# Patient Record
Sex: FEMALE | Race: WHITE | URBAN - METROPOLITAN AREA
[De-identification: names, ages, dates, MRNs, and addresses within clinical notes are randomized per-mention and may not be internally consistent; named-entity substitution may affect disease eponyms.]

---

## 2017-02-14 ENCOUNTER — TRANSFERRED RECORDS (OUTPATIENT)
Dept: HEALTH INFORMATION MANAGEMENT | Facility: CLINIC | Age: 47
End: 2017-02-14

## 2018-05-01 ENCOUNTER — TRANSFERRED RECORDS (OUTPATIENT)
Dept: HEALTH INFORMATION MANAGEMENT | Facility: CLINIC | Age: 48
End: 2018-05-01

## 2018-05-11 ENCOUNTER — TRANSFERRED RECORDS (OUTPATIENT)
Dept: HEALTH INFORMATION MANAGEMENT | Facility: CLINIC | Age: 48
End: 2018-05-11

## 2018-07-11 ENCOUNTER — OFFICE VISIT (OUTPATIENT)
Dept: ENDOCRINOLOGY | Facility: CLINIC | Age: 48
End: 2018-07-11
Payer: COMMERCIAL

## 2018-07-11 ENCOUNTER — TRANSFERRED RECORDS (OUTPATIENT)
Dept: HEALTH INFORMATION MANAGEMENT | Facility: CLINIC | Age: 48
End: 2018-07-11

## 2018-07-11 VITALS — DIASTOLIC BLOOD PRESSURE: 77 MMHG | HEART RATE: 70 BPM | SYSTOLIC BLOOD PRESSURE: 127 MMHG | WEIGHT: 167.99 LBS

## 2018-07-11 DIAGNOSIS — E10.9 TYPE 1 DIABETES MELLITUS WITHOUT COMPLICATION (H): Primary | ICD-10-CM

## 2018-07-11 DIAGNOSIS — E78.5 HYPERLIPIDEMIA WITH TARGET LDL LESS THAN 100: ICD-10-CM

## 2018-07-11 LAB — HBA1C MFR BLD: 6.9 % (ref 0–5.7)

## 2018-07-11 PROCEDURE — 36415 COLL VENOUS BLD VENIPUNCTURE: CPT | Performed by: INTERNAL MEDICINE

## 2018-07-11 PROCEDURE — 83036 HEMOGLOBIN GLYCOSYLATED A1C: CPT | Performed by: INTERNAL MEDICINE

## 2018-07-11 PROCEDURE — 99204 OFFICE O/P NEW MOD 45 MIN: CPT | Performed by: INTERNAL MEDICINE

## 2018-07-11 RX ORDER — KRILL/OM-3/DHA/EPA/PHOSPHO/AST 500MG-86MG
1 CAPSULE ORAL DAILY
COMMUNITY
End: 2018-11-13

## 2018-07-11 RX ORDER — MULTIVIT-MIN/IRON/FOLIC ACID/K 18-600-40
1 CAPSULE ORAL DAILY
COMMUNITY

## 2018-07-11 RX ORDER — ATORVASTATIN CALCIUM 40 MG/1
40 TABLET, FILM COATED ORAL DAILY
COMMUNITY

## 2018-07-11 NOTE — PATIENT INSTRUCTIONS
Foods to Encourage or Discourage:  1. Eat more fruits, vegetables and nuts in place of processed carbohydrates.  2. Choose healthier carbohydrates.  3. Choose healthier proteins.  4. No trans fats, reduce animal based saturated fats and replace with healthier, plant-based fats and oils. Learn to cook with these!  5. Avoid processed foods and desserts.  6. Imagine your  ideal plate  - protein; healthier carb; vegetables.  7. Consider the  dessert flip  with more fruit and smaller portions of indulgent favorites or  three pleasures - chocolate, fruit, nuts.   8. Portion control is nataliya -  It s the calories!!!   9. Find opportunities to reduce salt. Season with herbs and spices first.  10. Replace sugar sweetened beverages, emphasizing water, tea, and coffee.    Take Home Messages:  ? Make most of your meal vegetables and fruits -   of your plate: Aim for color and variety, and remember that potatoes don t count as vegetables on the Healthy Eating Plate because of their negative impact on blood sugar.  ? Go for whole grains -   of your plate: Whole and intact grains--whole wheat, barley, wheat berries, quinoa, oats, brown rice, and foods made with them, such as whole wheat pasta--have a milder effect on blood sugar and insulin than white bread, white rice, and other refined grains.  ? Protein power -   of your plate: Fish, chicken, beans, and nuts are all healthy, versatile protein sources--they can be mixed into salads, and pair well with vegetables on a plate. Limit red meat, and avoid processed meats such as sureo and sausage.  ? Healthy plant oils - in moderation: Choose healthy vegetable oils like olive, canola, soy, corn, sunflower, peanut, and others, and avoid partially hydrogenated oils, which contain unhealthy trans fats.  ? Drink water, coffee, or tea: Skip sugary drinks, limit milk and dairy products to one to two servings per day, and limit juice to a small glass per day.  ? Stay active: Staying active is  important in weight control                          Sending blood sugars to your provider at [facility]:  We want to help you with your diabetes management, which often requires frequent adjustments to your therapy. For your convenience, we have several ways to send your blood sugars to your doctor for review.    - Send message directly to your doctor through My Chart.  Please ask the rooming staff if you would like to sign up for My Chart.  This is a fast and confidential way to send your information and communicate directly with your provider.   - Record readings and fax to [phone].  We have a template for you to use for your convenience.  - Stop by the clinic with your meter for download if advised by provider.   - My Chart or call Vale Collado, Diabetes Educator at [phone]  - Call the clinic and speak to one of the endocrine nurses to relay information on the telephone.  Claudia Fuentes,at [phone].   -    Please call the on-call Endocrinologist at the [facility] for after       hours/weekend needs at [phone] Option #4.    Please note that you do not need to FAST if you are just having an A1C drawn. Please remember to ALWAYS bring your glucose meter with to your appointment. This data is very important for the management of your care.    Thank you!  Your [facility] Team

## 2018-07-11 NOTE — NURSING NOTE
Nancy Bedoya's goals for this visit include: Diabetes, New Pt  She requests these members of her care team be copied on today's visit information: PCP    PCP: Andrea Asencio    Referring Provider:  Andrea Asencio  [facility]  [address] [facility] [place] [address]    Pulse 70  Wt 76.2 kg (167 lb 15.9 oz)  /77    Do you need any medication refills at today's visit? None

## 2018-07-11 NOTE — LETTER
7/11/2018         RE: Nancy Bedoya  [address] [place] [address]        Dear Colleague,    Thank you for referring your patient, Nancy Bedoya, to the [facility]. Please see a copy of my visit note below.      The patient is seen in consultation at the request of Dr. Andrea Asencio for evaluation of type 1 diabetes.    Nancy Bedoya is a 48 year old female diagnosed with gestational diabetes with 2 out of her 3 pregnancies (the first pregnancy, at age 27, and the second pregnancy, at age 29).  Shortly after her second pregnancy, she was formally diagnosed diabetes and, for a short period of time, she was treated with oral medications. Subsequently, she was told she has a low C-peptide and type 1 diabetes.  Insulin treatment was started around age 30 and she has been using CSII through insulin pump since age 32.  In the past, she was on Medtronic insulin pump.  Since 2017, she is using a Tandem pump.    Average hemoglobin A1c over the last 5 years has been around 7%.  In the past, it used to be better controlled, below 7%.  Her diabetes is not known to be complicated by microvascular disease.    Hemoglobin A1c was 6.9%.  She denies ever being on continuous glucose monitoring.  Since last August, she enrolled in the [employer] and she was able to lose 40 pounds.  She is now transitioning from this diet and she is pleased by the fact that she was able to keep the weight off.    In a regular day, she has 6 small meals/snacks, throughout the day.  She describes herself as constantly grazing.  With large meals, she has a hard time controlling the blood sugar with insulin boluses.  If she has over 70 g of carbohydrates/meal, her blood sugars almost always high.  With smaller meals, she feels she achieves a better blood glucose control.  For snacks, she frequently has protein bars, peanuts, popcorn.    Insulin pump settings:  Basal rate  12 AM 0.8 U/h, 3 AM 1.35 U/h, 7 AM 0.8 U/h, 12 PM 1  U/h  Insulin to carbohydrate ratio 1 unit per 10  ISF 50  Target 120  Active insulin time 4 hours    Average daily dose of insulin is 36 units, of which 46% is basal insulin.  She changes the infusion site every third day.  Average daily carbohydrate intake is 65 g.  60% of the blood sugar numbers are within target of .  On the meter, she checks her blood glucose 5 times daily.  Average blood glucose is 171 with a standard deviation of 74 and a range variable between 36 and 372.  The morning blood sugar has a wide range of variation, with intermittent values in the 200 range.  In the last 2 weeks, there are 3 hypoglycemic episodes documented by the meter.  All of them occurred in the afternoon/evening.  1 of them was a result of correcting her blood sugar of 372, with a manual bolus.  The lowest blood glucose of 36 occurred after 7 manual boluses of 2-4 units which occurred over a period of 4 hours, without her checking her blood sugar.  Most of the boluses are manual boluses of 2-5 units.  Quite frequently, she also boluses manually for correction.  She states she prefers to bolus manually as she knows the carbohydrate content of her meals and snacks, most of the time.    Diabetes complications:   Retinopathy: last eye exam - May 2018; no DR   Nephropathy: no H/O microalbuminuria   Neuropathy: no numbness or tingling sensation   If her blood sugar is in the 60s, she develops sweating, tremor and lightheadedness.  She corrects the hypoglycemic episodes by having 15 g of the food snacks.  The lowest blood glucose numbers she remembers experiencing was in the 40s.  She denies prior episodes of loss of consciousness due to hypoglycemia.  She does not have glucagon at home.  Exercise: Walks the dogs in the evening, for 30 minutes.   Treatment with atorvastatin was started 9 months ago, currently at 40 mg daily.    I reviewed outside lab results  1/24/17  Hemoglobin A1c 7.5, total cholesterol 188, triglycerides 90,  HDL 54, , GFR 89    12/12/17  Hemoglobin A1c 6.1, negative urine microalbumin    5/11/18  Triglycerides 53, HDL 56, LDL 43, ALT 18    Past Medical History   Obesity   Hyperlipidemia   Type 1 diabetes  One episode of vertigo   Screening for celiac negative in 2014  On Mirena IUD - for >10 years (no MP)     Past Surgical Hystory   Tonsillectomy and adenoidectoy  C section x 1   Current Medications       Family History   T1DM in her sister, T2DM in her father. Father - valcular heart disease. Mother - Parkinson's and osteoporosis. Brother - scleroderma. One sister - osteoporosis. Mother broke her hip in her 70s.     Social History   with 3 children. She denies smoking or using illicit drugs. She drinks 1-2 glasses of wine, twice a week. Occupation: .     Review of Systems   Systemic:               No sign fatigue  Eye:                       No eye symptoms   Edgar-Laryngeal:      No edgar-laryngeal symptoms, no dysphagia, no voice hoarseness, no cough      Breast:                   No breast symptoms  Cardiovascular:     No cardiovascular symptoms, no CP or palpitations   Pulmonary:            No pulmonary symptoms, no cough or SOB    Gastrointestinal:    No gastrointestinal symptoms, no diarrhea or constipation   Genitourinary:        No genitourinary symptoms    Endocrine:             No endocrine symptoms, no heat or cold intolerance   Neurological:          no headaches, no tremor, no dizziness     Musculoskeletal:    Left hip intermittent pain   Skin:                       Scalp eczema with itchiness   Psychological:       No psychological symptoms                Vital Signs     Previous Weights:    Wt Readings from Last 10 Encounters:   07/11/18 76.2 kg (167 lb 15.9 oz)                   /77  Pulse 70  Wt 76.2 kg (167 lb 15.9 oz)    Physical Exam  General Appearance:  she is well developed, well nourished and in no distress     Eyes:  conjutivae and extra-ocular motions are normal.      "                               pupils round and reactive to light, no lid lag, no stare    HEENT:   oropharynx clear and moist, no JVD, no bruits      no thyromegaly, no palpable nodules   Cardiovascular:  regular rhythm, systolic murmur right 2nd IC space, distal pulse palpable, no edema  Respiratory:       chest clear, no rales, no rhonchi   Gastrointestinal: abdomen soft, non-tender, non-distended, normal bowel sounds,   no organomegaly   Musculoskeletal: normal tone and strength  Psychiatric: affect and judgment normal  Skin: Benign abd striae   Neurological: reflexes normal and symmetric, no resting tremor   Feet                          sensation intact to monofilament testing      Lab Results  I reviewed prior lab results documented in Epic.  Lab Results   Component Value Date    A1C 6.9 07/11/2018     Assessment     Type 1 diabetes in a 48 year old female, diagnosed around age 29, fairly well controlled, without known microvascular disease.  She frequently boluses manually, both for food intake and correction, and this results in occasional episodes of insulin stalking and hypoglycemia.    The following recommendations were discussed with the patient:  Always use the bolus settings for all meals and snacks.  Use the pump to administer insulin as correction, based on the blood sugar value  She does not \"need\" frequent meals or snacks during the day.  Sometimes, with frequent eating, it is harder to maintain the weight.  However, as long as she takes the appropriate amount of insulin for what she eats, the glycemic control should be good.  Consider a glucose sensor.  The advantages were discussed with the patient: Better blood glucose control during the night, alarms for both highs and lows, less blood glucose checks, providing help in adjusting the basal rate and insulin to carbohydrate ratio.  She is going to check with her insurance whether or not the Dexcom G6 cost is covered.  Use of glucagon for severe " hypoglycemic episodes.  Prescription entered.    The patient has questions regarding what kind of diet she should follow.  I endorsed complex diets, with fairly low carbohydrate intake.  In particular, we discussed about the Mediterranean diet and ways she can decrease the overall caloric intake.     Orders Placed This Encounter   Procedures     Hemoglobin A1c POCT      Total visit time 50 min/counceling and coordination of care 25 min.        Again, thank you for allowing me to participate in the care of your patient.        Sincerely,        Page Rosado MD

## 2018-07-11 NOTE — PROGRESS NOTES
The patient is seen in consultation at the request of Dr. Andrea Asencio for evaluation of type 1 diabetes.    Nancy Bedoya is a 48 year old female diagnosed with gestational diabetes with 2 out of her 3 pregnancies (the first pregnancy, at age 27, and the second pregnancy, at age 29).  Shortly after her second pregnancy, she was formally diagnosed diabetes and, for a short period of time, she was treated with oral medications. Subsequently, she was told she has a low C-peptide and type 1 diabetes.  Insulin treatment was started around age 30 and she has been using CSII through insulin pump since age 32.  In the past, she was on Medtronic insulin pump.  Since 2017, she is using a Tandem pump.    Average hemoglobin A1c over the last 5 years has been around 7%.  In the past, it used to be better controlled, below 7%.  Her diabetes is not known to be complicated by microvascular disease.    Hemoglobin A1c was 6.9%.  She denies ever being on continuous glucose monitoring.  Since last August, she enrolled in the [employer] and she was able to lose 40 pounds.  She is now transitioning from this diet and she is pleased by the fact that she was able to keep the weight off.    In a regular day, she has 6 small meals/snacks, throughout the day.  She describes herself as constantly grazing.  With large meals, she has a hard time controlling the blood sugar with insulin boluses.  If she has over 70 g of carbohydrates/meal, her blood sugars almost always high.  With smaller meals, she feels she achieves a better blood glucose control.  For snacks, she frequently has protein bars, peanuts, popcorn.    Insulin pump settings:  Basal rate  12 AM 0.8 U/h, 3 AM 1.35 U/h, 7 AM 0.8 U/h, 12 PM 1 U/h  Insulin to carbohydrate ratio 1 unit per 10  ISF 50  Target 120  Active insulin time 4 hours    Average daily dose of insulin is 36 units, of which 46% is basal insulin.  She changes the infusion site every third day.  Average daily  carbohydrate intake is 65 g.  60% of the blood sugar numbers are within target of .  On the meter, she checks her blood glucose 5 times daily.  Average blood glucose is 171 with a standard deviation of 74 and a range variable between 36 and 372.  The morning blood sugar has a wide range of variation, with intermittent values in the 200 range.  In the last 2 weeks, there are 3 hypoglycemic episodes documented by the meter.  All of them occurred in the afternoon/evening.  1 of them was a result of correcting her blood sugar of 372, with a manual bolus.  The lowest blood glucose of 36 occurred after 7 manual boluses of 2-4 units which occurred over a period of 4 hours, without her checking her blood sugar.  Most of the boluses are manual boluses of 2-5 units.  Quite frequently, she also boluses manually for correction.  She states she prefers to bolus manually as she knows the carbohydrate content of her meals and snacks, most of the time.    Diabetes complications:   Retinopathy: last eye exam - May 2018; no DR   Nephropathy: no H/O microalbuminuria   Neuropathy: no numbness or tingling sensation   If her blood sugar is in the 60s, she develops sweating, tremor and lightheadedness.  She corrects the hypoglycemic episodes by having 15 g of the food snacks.  The lowest blood glucose numbers she remembers experiencing was in the 40s.  She denies prior episodes of loss of consciousness due to hypoglycemia.  She does not have glucagon at home.  Exercise: Walks the dogs in the evening, for 30 minutes.   Treatment with atorvastatin was started 9 months ago, currently at 40 mg daily.    I reviewed outside lab results  1/24/17  Hemoglobin A1c 7.5, total cholesterol 188, triglycerides 90, HDL 54, , GFR 89    12/12/17  Hemoglobin A1c 6.1, negative urine microalbumin    5/11/18  Triglycerides 53, HDL 56, LDL 43, ALT 18    Past Medical History   Obesity   Hyperlipidemia   Type 1 diabetes  One episode of vertigo    Screening for celiac negative in 2014  On Mirena IUD - for >10 years (no MP)     Past Surgical Hystory   Tonsillectomy and adenoidectoy  C section x 1   Current Medications       Family History   T1DM in her sister, T2DM in her father. Father - valcular heart disease. Mother - Parkinson's and osteoporosis. Brother - scleroderma. One sister - osteoporosis. Mother broke her hip in her 70s.     Social History   with 3 children. She denies smoking or using illicit drugs. She drinks 1-2 glasses of wine, twice a week. Occupation: .     Review of Systems   Systemic:               No sign fatigue  Eye:                       No eye symptoms   Edgar-Laryngeal:      No edgar-laryngeal symptoms, no dysphagia, no voice hoarseness, no cough      Breast:                   No breast symptoms  Cardiovascular:     No cardiovascular symptoms, no CP or palpitations   Pulmonary:            No pulmonary symptoms, no cough or SOB    Gastrointestinal:    No gastrointestinal symptoms, no diarrhea or constipation   Genitourinary:        No genitourinary symptoms    Endocrine:             No endocrine symptoms, no heat or cold intolerance   Neurological:          no headaches, no tremor, no dizziness     Musculoskeletal:    Left hip intermittent pain   Skin:                       Scalp eczema with itchiness   Psychological:       No psychological symptoms                Vital Signs     Previous Weights:    Wt Readings from Last 10 Encounters:   07/11/18 76.2 kg (167 lb 15.9 oz)                   /77  Pulse 70  Wt 76.2 kg (167 lb 15.9 oz)    Physical Exam  General Appearance:  she is well developed, well nourished and in no distress     Eyes:  conjutivae and extra-ocular motions are normal.                                    pupils round and reactive to light, no lid lag, no stare    HEENT:   oropharynx clear and moist, no JVD, no bruits      no thyromegaly, no palpable nodules   Cardiovascular:  regular rhythm, systolic  "murmur right 2nd IC space, distal pulse palpable, no edema  Respiratory:       chest clear, no rales, no rhonchi   Gastrointestinal: abdomen soft, non-tender, non-distended, normal bowel sounds,   no organomegaly   Musculoskeletal: normal tone and strength  Psychiatric: affect and judgment normal  Skin: Benign abd striae   Neurological: reflexes normal and symmetric, no resting tremor   Feet                          sensation intact to monofilament testing      Lab Results  I reviewed prior lab results documented in Epic.  Lab Results   Component Value Date    A1C 6.9 07/11/2018     Assessment     Type 1 diabetes in a 48 year old female, diagnosed around age 29, fairly well controlled, without known microvascular disease.  She frequently boluses manually, both for food intake and correction, and this results in occasional episodes of insulin stalking and hypoglycemia.    The following recommendations were discussed with the patient:  Always use the bolus settings for all meals and snacks.  Use the pump to administer insulin as correction, based on the blood sugar value  She does not \"need\" frequent meals or snacks during the day.  Sometimes, with frequent eating, it is harder to maintain the weight.  However, as long as she takes the appropriate amount of insulin for what she eats, the glycemic control should be good.  Consider a glucose sensor.  The advantages were discussed with the patient: Better blood glucose control during the night, alarms for both highs and lows, less blood glucose checks, providing help in adjusting the basal rate and insulin to carbohydrate ratio.  She is going to check with her insurance whether or not the Dexcom G6 cost is covered.  Use of glucagon for severe hypoglycemic episodes.  Prescription entered.    The patient has questions regarding what kind of diet she should follow.  I endorsed complex diets, with fairly low carbohydrate intake.  In particular, we discussed about the " Mediterranean diet and ways she can decrease the overall caloric intake.     Orders Placed This Encounter   Procedures     Hemoglobin A1c POCT      Total visit time 50 min/counceling and coordination of care 25 min.

## 2018-07-11 NOTE — MR AVS SNAPSHOT
After Visit Summary   7/11/2018    Nancy Bedoya    MRN: [id number]           Patient Information     Date Of Birth          1970        Visit Information        Provider Department      7/11/2018 9:00 AM Page Rosado MD [facility]        Today's Diagnoses     Type 1 diabetes mellitus without complication (H)    -  1    Hyperlipidemia with target LDL less than 100          Care Instructions    Foods to Encourage or Discourage:  1. Eat more fruits, vegetables and nuts in place of processed carbohydrates.  2. Choose healthier carbohydrates.  3. Choose healthier proteins.  4. No trans fats, reduce animal based saturated fats and replace with healthier, plant-based fats and oils. Learn to cook with these!  5. Avoid processed foods and desserts.  6. Imagine your  ideal plate  - protein; healthier carb; vegetables.  7. Consider the  dessert flip  with more fruit and smaller portions of indulgent favorites or  three pleasures - chocolate, fruit, nuts.   8. Portion control is nataliya -  It s the calories!!!   9. Find opportunities to reduce salt. Season with herbs and spices first.  10. Replace sugar sweetened beverages, emphasizing water, tea, and coffee.    Take Home Messages:  ? Make most of your meal vegetables and fruits -   of your plate: Aim for color and variety, and remember that potatoes don t count as vegetables on the Healthy Eating Plate because of their negative impact on blood sugar.  ? Go for whole grains -   of your plate: Whole and intact grains--whole wheat, barley, wheat berries, quinoa, oats, brown rice, and foods made with them, such as whole wheat pasta--have a milder effect on blood sugar and insulin than white bread, white rice, and other refined grains.  ? Protein power -   of your plate: Fish, chicken, beans, and nuts are all healthy, versatile protein sources--they can be mixed into salads, and pair well with vegetables on a plate. Limit red meat,  and avoid processed meats such as suero and sausage.  ? Healthy plant oils - in moderation: Choose healthy vegetable oils like olive, canola, soy, corn, sunflower, peanut, and others, and avoid partially hydrogenated oils, which contain unhealthy trans fats.  ? Drink water, coffee, or tea: Skip sugary drinks, limit milk and dairy products to one to two servings per day, and limit juice to a small glass per day.  ? Stay active: Staying active is important in weight control                          Sending blood sugars to your provider at [place]:  We want to help you with your diabetes management, which often requires frequent adjustments to your therapy. For your convenience, we have several ways to send your blood sugars to your doctor for review.    - Send message directly to your doctor through My Chart.  Please ask the rooming staff if you would like to sign up for My Chart.  This is a fast and confidential way to send your information and communicate directly with your provider.   - Record readings and fax to [phone].  We have a template for you to use for your convenience.  - Stop by the clinic with your meter for download if advised by provider.   - My Chart or call Vale Collado, Diabetes Educator at [phone]  - Call the clinic and speak to one of the endocrine nurses to relay information on the telephone.  Claudia Fuentes,at [phone].   -    Please call the on-call Endocrinologist at the [facility] for after       hours/weekend needs at [phone] Option #4.    Please note that you do not need to FAST if you are just having an A1C drawn. Please remember to ALWAYS bring your glucose meter with to your appointment. This data is very important for the management of your care.    Thank you!  Your [place] [facility] Team                Follow-ups after your visit        Follow-up notes from your care team     Return in about 4 months (around 11/11/2018).      Your next 10 appointments  already scheduled     Nov 13, 2018 10:45 AM CST   Return Visit with Page Rosado MD, MG ENDO NURSE   [facility]    [address] [address] [place] [id number] [phone]              Future tests that were ordered for you today     Open Standing Orders        Priority Remaining Interval Expires Ordered    Hemoglobin A1c POCT Routine 9/10  7/11/2019 7/11/2018            Who to contact     If you have questions or need follow up information about today's clinic visit or your schedule please contact [facility] directly at [phone].  Normal or non-critical lab and imaging results will be communicated to you by MyChart, letter or phone within 4 business days after the clinic has received the results. If you do not hear from us within 7 days, please contact the clinic through MyChart or phone. If you have a critical or abnormal lab result, we will notify you by phone as soon as possible.  Submit refill requests through [employer] or call your pharmacy and they will forward the refill request to us. Please allow 3 business days for your refill to be completed.          Additional Information About Your Visit        Care EveryWhere ID     This is your Care EveryWhere ID. This could be used by other organizations to access your [place] medical records  [id number]        Your Vitals Were     Pulse                   70            Blood Pressure from Last 3 Encounters:   07/11/18 127/77    Weight from Last 3 Encounters:   07/11/18 76.2 kg (167 lb 15.9 oz)              We Performed the Following     Hemoglobin A1c POCT          Today's Medication Changes          These changes are accurate as of 7/11/18 10:20 AM.  If you have any questions, ask your nurse or doctor.               Start taking these medicines.        Dose/Directions    glucagon 1 MG kit   Commonly known as:  GLUCAGON EMERGENCY   Used for:  Type 1 diabetes mellitus without  complication (H)   Started by:  Page Rosado MD        Dose:  1 mg   Inject 1 mg into the muscle once for 1 dose   Quantity:  1 mg   Refills:  3            Where to get your medicines      These medications were sent to [id number] - [place], [place] - [address]  [address], [facility] [address]     Phone:  [phone]     glucagon 1 MG kit                Primary Care Provider Office Phone # Fax #    Andrea Asencio [phone]       [facility] [address] [facility] [place] [address]        Equal Access to Services     [facility]: Hadii aad ku hadasho Soomaali, waaxda luqadaha, qaybta kaalmada adeegyada, waxay idiin hayaan adeeg giovanny rivera . So [facility] [phone].    ATENCIÓN: Si habla español, tiene a mosqueda disposición servicios gratuitos de asistencia lingüística. Llame al [phone].    We comply with applicable federal civil rights laws and [place] laws. We do not discriminate on the basis of race, color, national origin, age, disability, sex, sexual orientation, or gender identity.            Thank you!     Thank you for choosing [facility]  for your care. Our goal is always to provide you with excellent care. Hearing back from our patients is one way we can continue to improve our services. Please take a few minutes to complete the written survey that you may receive in the mail after your visit with us. Thank you!             Your Updated Medication List - Protect others around you: Learn how to safely use, store and throw away your medicines at www.disposemymeds.org.          This list is accurate as of 7/11/18 10:20 AM.  Always use your most recent med list.                   Brand Name Dispense Instructions for use Diagnosis    atorvastatin 40 MG tablet    LIPITOR     Take 40 mg by mouth daily        glucagon 1 MG kit    GLUCAGON EMERGENCY    1 mg    Inject 1 mg into the muscle once for 1 dose    Type 1  diabetes mellitus without complication (H)       insulin lispro 100 UNIT/ML injection    HumaLOG     Inject Subcutaneous 3 times daily (before meals) Continuous per Tandem Pump        Krill Oil 500 MG Caps      Take 1 capsule by mouth daily        levonorgestrel 20 MCG/24HR IUD    MIRENA     1 each by Intrauterine route once        vitamin D 2000 units Caps      Take 1 tablet by mouth daily

## 2018-11-13 ENCOUNTER — HEALTH MAINTENANCE LETTER (OUTPATIENT)
Age: 48
End: 2018-11-13

## 2018-11-13 ENCOUNTER — OFFICE VISIT (OUTPATIENT)
Dept: ENDOCRINOLOGY | Facility: CLINIC | Age: 48
End: 2018-11-13
Payer: COMMERCIAL

## 2018-11-13 VITALS
DIASTOLIC BLOOD PRESSURE: 82 MMHG | OXYGEN SATURATION: 100 % | SYSTOLIC BLOOD PRESSURE: 125 MMHG | BODY MASS INDEX: 32.5 KG/M2 | HEART RATE: 66 BPM | HEIGHT: 62 IN | WEIGHT: 176.6 LBS

## 2018-11-13 DIAGNOSIS — E10.9 TYPE 1 DIABETES MELLITUS NOT AT GOAL (H): Primary | ICD-10-CM

## 2018-11-13 LAB — HBA1C MFR BLD: 7.1 % (ref 0–5.6)

## 2018-11-13 PROCEDURE — 99214 OFFICE O/P EST MOD 30 MIN: CPT | Performed by: INTERNAL MEDICINE

## 2018-11-13 PROCEDURE — 83036 HEMOGLOBIN GLYCOSYLATED A1C: CPT | Performed by: INTERNAL MEDICINE

## 2018-11-13 PROCEDURE — 36415 COLL VENOUS BLD VENIPUNCTURE: CPT | Performed by: INTERNAL MEDICINE

## 2018-11-13 NOTE — NURSING NOTE
"Nancy Bedoya's goals for this visit include: Dm follow up  She requests these members of her care team be copied on today's visit information: No    PCP: Andrea Asencio    Referring Provider:  No referring provider defined for this encounter.    /82 (BP Location: Left arm, Patient Position: Chair, Cuff Size: Adult Regular)  Pulse 66  Ht 1.565 m (5' 1.61\")  Wt 80.1 kg (176 lb 9.6 oz)  SpO2 100%  BMI 32.71 kg/m2    Do you need any medication refills at today's visit? No    "

## 2018-11-13 NOTE — PATIENT INSTRUCTIONS
[facility]  Vale Collado RN, Diabetes Educator: [phone]  Clinic Nurses Claudia Fuentes: [phone]  Clinic Fax: [phone]  On-Call Endocrine at the [facility] (after hours/weekends): [phone] option 4  Scheduling Line: [phone]    Appointment Reminders:  * Please bring meter with for staff to download  * If you are due ONLY for an A1C, it is scheduled with the nurse and will be done in clinic. You do not need to schedule a lab appointment. Fasting is not required for an A1C.  * Refill request should be submitted to your pharmacy. They will contact clinic for approval.    Preventive Care:    Diabetic Eye Exam Screening: During our visit today, we discussed that it is recommended you receive diabetic eye exam screening. Please call or make an appointment with your primary care provider to discuss this with them. You may also call the  [employer] scheduling line ([phone]) to set up an eye exam at one of the [facility].            Preventive Care:    Diabetic Eye Exam Screening: During our visit today, we discussed that it is recommended you receive diabetic eye exam screening. Please call or make an appointment with your primary care provider to discuss this with them. You may also call the  [employer] scheduling line ([phone]) to set up an eye exam at one of the [facility].

## 2018-11-13 NOTE — Clinical Note
Please abstract the following data from this visit with this patient into the appropriate field in Epic:  Eye exam with ophthalmology on this date: May 2018, [facility] [place]

## 2018-11-13 NOTE — LETTER
11/13/2018         RE: Nancy Bedoya  [address] [place] [address]        Dear Colleague,    Thank you for referring your patient, Nancy Beodya, to the [facility]. Please see a copy of my visit note below.      The patient is seen in f/up for evaluation of type 1 diabetes. She was fist seen in our clinic in 7/2018.     Nancy Bedoya is a 48 year old female diagnosed with gestational diabetes with 2 out of her 3 pregnancies (the first pregnancy, at age 27, and the second pregnancy, at age 29).  Shortly after her second pregnancy, she was formally diagnosed diabetes and, for a short period of time, she was treated with oral medications. Subsequently, she was told she has a low C-peptide and type 1 diabetes.  Insulin treatment was started around age 30 and she has been using CSII through insulin pump since age 32.  In the past, she was on Medtronic insulin pump.  Since 2017, she is using a Tandem pump.    Average hemoglobin A1c over the recent years has been around 7%. Her diabetes is not known to be complicated by microvascular disease.  Since her last visit here, she reports being compliant with Medifast diet.  Subsequently, she lost 40 pounds of which she regained 10, recently.    Hemoglobin A1c today was 7.1% stable since her last visit here when it was  6.9%.    In a regular day, she has 6 small meals/snacks, throughout the day.  She describes herself as constantly grazing.  With smaller meals, she feels she achieves a better blood glucose control.  For snacks, she frequently has protein bars, peanuts, popcorn.    Insulin pump settings:  Basal rate  12 AM 0.8 U/h, 3 AM 1 U/h, 7 AM 0.8 U/h, 12 PM 1 U/h  Insulin to carbohydrate ratio 1 unit per 10 and 1 unit per 13 g from 3 PM to 12 midnight  ISF 50  Target 120  Active insulin time 2 hours    Average daily dose of insulin is 42 units, of which 47% is basal insulin.    On the meter, average blood glucose is 150 with a standard deviation  of 68 and a range variable between 54 and 338.  She checks her blood glucose 3 times daily, not so much in the evening and at bedtime. The hypoglycemic episodes are present once or twice a week, always during daytime, not at night.  They have an unpredictable pattern and they are mild. Recently she has been using the bolus settings, mainly in the last week. Prior, most of the boluses are manual boluses.    Diabetes complications:   Retinopathy: last eye exam - May 2018; no DR   Nephropathy: no H/O microalbuminuria   Neuropathy: no numbness or tingling sensation   If her blood sugar is in the 60s, she develops sweating, tremor and lightheadedness.  She corrects the hypoglycemic episodes by having 15 g of the food snacks.  The lowest blood glucose numbers she remembers experiencing was in the 40s.  She denies prior episodes of loss of consciousness due to hypoglycemia.  She does not have glucagon at home.  Exercise: Walks the dogs in the evening, for 30 minutes.   Atorvastatin was prescribed at 40 mg daily.    I reviewed outside lab results  1/24/17  Hemoglobin A1c 7.5, total cholesterol 188, triglycerides 90, HDL 54, , GFR 89    12/12/17  Hemoglobin A1c 6.1, negative urine microalbumin    5/11/18  Triglycerides 53, HDL 56, LDL 43, ALT 18    Past Medical History   Obesity   Hyperlipidemia   Type 1 diabetes  One episode of vertigo   Screening for celiac negative in 2014  On Mirena IUD - for >10 years (no MP)     Past Surgical Hystory   Tonsillectomy and adenoidectoy  C section x 1   Current Medications   Prescription Medications as of 11/13/2018             atorvastatin (LIPITOR) 40 MG tablet Take 40 mg by mouth daily    Cholecalciferol (VITAMIN D) 2000 units CAPS Take 1 tablet by mouth daily    glucagon (GLUCAGON EMERGENCY) 1 MG kit Inject 1 mg into the muscle once for 1 dose    insulin lispro (HUMALOG) 100 UNIT/ML injection Inject Subcutaneous 3 times daily (before meals) Continuous per Tandem Pump    Krill  "Oil 500 MG CAPS Take 1 capsule by mouth daily    levonorgestrel (MIRENA) 20 MCG/24HR IUD 1 each by Intrauterine route once          Family History   T1DM in her sister, T2DM in her father. Father - valcular heart disease. Mother - Parkinson's and osteoporosis. Brother - scleroderma. One sister - osteoporosis. Mother broke her hip in her 70s.     Social History   with 3 children. She denies smoking or using illicit drugs. She drinks 1-2 glasses of wine, twice a week. Occupation: .     Review of Systems   Systemic:               No sign fatigue  Eye:                       No eye symptoms   Edgar-Laryngeal:      No edgar-laryngeal symptoms, no dysphagia, no voice hoarseness, no cough      Breast:                   No breast symptoms  Cardiovascular:     No cardiovascular symptoms, no CP; rapid rhthm palpitations present occasionally and lasting less then 2 minutes -    Pulmonary:            No pulmonary symptoms, no cough or SOB    Gastrointestinal:    No gastrointestinal symptoms, no diarrhea or constipation   Genitourinary:        No genitourinary symptoms    Endocrine:             No endocrine symptoms, no heat or cold intolerance   Neurological:          no headaches, no tremor, no dizziness     Musculoskeletal:    Left hip intermittent pain   Skin:                       Scalp eczema with itchiness   Psychological:       No psychological symptoms                Vital Signs     Previous Weights:    Wt Readings from Last 10 Encounters:   11/13/18 80.1 kg (176 lb 9.6 oz)   07/11/18 76.2 kg (167 lb 15.9 oz)                   /82 (BP Location: Left arm, Patient Position: Chair, Cuff Size: Adult Regular)  Pulse 66  Ht 1.565 m (5' 1.61\")  Wt 80.1 kg (176 lb 9.6 oz)  SpO2 100%  BMI 32.71 kg/m2    Physical Exam  General Appearance:  she is well developed, well nourished and in no distress     Eyes:  conjutivae and extra-ocular motions are normal.                                    pupils round and " reactive to light, no lid lag, no stare    HEENT:   oropharynx clear and moist, no JVD, no bruits      no thyromegaly, no palpable nodules   Cardiovascular:  regular rhythm, systolic murmur right 2nd IC space, distal pulse palpable, no edema  Respiratory:       chest clear, no rales, no rhonchi   Gastrointestinal: abdomen soft, non-tender, non-distended, normal bowel sounds,   no organomegaly   Musculoskeletal: normal tone and strength  Psychiatric: affect and judgment normal  Skin: Benign abd striae   Neurological: reflexes normal and symmetric, no resting tremor      Lab Results  I reviewed prior lab results documented in Epic.  Lab Results   Component Value Date    A1C 7.1 (A) 11/13/2018    A1C 6.9 07/11/2018       No results found for: HGB  No results found for: HCT  No results found for: CHOL, CHOLHDLRATIO, HDL, LDL, VLDL, TRIG  No results found for: MICROL  No results found for: TSH      Last Basic Metabolic Panel:    No results found for: NA  No results found for: POTASSIUM  No results found for: CHLORIDE  No results found for: MIHIR  No results found for: CO2  No results found for: BUN  No results found for: CR  No results found for: GFRESTIMATED  No results found for: GLC    No results found for: AST  No results found for: ALT  No results found for: UMALCR    Assessment     Type 1 diabetes in a 48 year old female, diagnosed around age 29, fairly well controlled, without known microvascular disease.  She frequently boluses manually, mainly for food intake.    Recommendations:  Change sensitivity to 45  Change insulin to carbohydrate ratio to 1 unit per 10 g around the clock  Change active insulin time to 3 hours  Use the pump to administer insulin as correction, based on the blood sugar value  Used bolus settings for all meals and snacks  Administer insulin 15-20 minutes prior to eating.  Follow-up lab work at her next visit    Orders Placed This Encounter   Procedures     Albumin Random Urine Quantitative  with Creat Ratio     Comprehensive metabolic panel     Hematocrit     Lipid panel reflex to direct LDL Fasting     TSH with free T4 reflex     Hemoglobin A1c       Again, thank you for allowing me to participate in the care of your patient.        Sincerely,        Page Rosado MD

## 2018-11-13 NOTE — PROGRESS NOTES
The patient is seen in f/up for evaluation of type 1 diabetes. She was fist seen in our clinic in 7/2018.     Nancy Bedoya is a 48 year old female diagnosed with gestational diabetes with 2 out of her 3 pregnancies (the first pregnancy, at age 27, and the second pregnancy, at age 29).  Shortly after her second pregnancy, she was formally diagnosed diabetes and, for a short period of time, she was treated with oral medications. Subsequently, she was told she has a low C-peptide and type 1 diabetes.  Insulin treatment was started around age 30 and she has been using CSII through insulin pump since age 32.  In the past, she was on Medtronic insulin pump.  Since 2017, she is using a Tandem pump.    Average hemoglobin A1c over the recent years has been around 7%. Her diabetes is not known to be complicated by microvascular disease.  Since her last visit here, she reports being compliant with Medifast diet.  Subsequently, she lost 40 pounds of which she regained 10, recently.    Hemoglobin A1c today was 7.1% stable since her last visit here when it was  6.9%.    In a regular day, she has 6 small meals/snacks, throughout the day.  She describes herself as constantly grazing.  With smaller meals, she feels she achieves a better blood glucose control.  For snacks, she frequently has protein bars, peanuts, popcorn.    Insulin pump settings:  Basal rate  12 AM 0.8 U/h, 3 AM 1 U/h, 7 AM 0.8 U/h, 12 PM 1 U/h  Insulin to carbohydrate ratio 1 unit per 10 and 1 unit per 13 g from 3 PM to 12 midnight  ISF 50  Target 120  Active insulin time 2 hours    Average daily dose of insulin is 42 units, of which 47% is basal insulin.    On the meter, average blood glucose is 150 with a standard deviation of 68 and a range variable between 54 and 338.  She checks her blood glucose 3 times daily, not so much in the evening and at bedtime. The hypoglycemic episodes are present once or twice a week, always during daytime, not at night.  They  have an unpredictable pattern and they are mild. Recently she has been using the bolus settings, mainly in the last week. Prior, most of the boluses are manual boluses.    Diabetes complications:   Retinopathy: last eye exam - May 2018; no DR   Nephropathy: no H/O microalbuminuria   Neuropathy: no numbness or tingling sensation   If her blood sugar is in the 60s, she develops sweating, tremor and lightheadedness.  She corrects the hypoglycemic episodes by having 15 g of the food snacks.  The lowest blood glucose numbers she remembers experiencing was in the 40s.  She denies prior episodes of loss of consciousness due to hypoglycemia.  She does not have glucagon at home.  Exercise: Walks the dogs in the evening, for 30 minutes.   Atorvastatin was prescribed at 40 mg daily.    I reviewed outside lab results  1/24/17  Hemoglobin A1c 7.5, total cholesterol 188, triglycerides 90, HDL 54, , GFR 89    12/12/17  Hemoglobin A1c 6.1, negative urine microalbumin    5/11/18  Triglycerides 53, HDL 56, LDL 43, ALT 18    Past Medical History   Obesity   Hyperlipidemia   Type 1 diabetes  One episode of vertigo   Screening for celiac negative in 2014  On Mirena IUD - for >10 years (no MP)     Past Surgical Hystory   Tonsillectomy and adenoidectoy  C section x 1   Current Medications   Prescription Medications as of 11/13/2018             atorvastatin (LIPITOR) 40 MG tablet Take 40 mg by mouth daily    Cholecalciferol (VITAMIN D) 2000 units CAPS Take 1 tablet by mouth daily    glucagon (GLUCAGON EMERGENCY) 1 MG kit Inject 1 mg into the muscle once for 1 dose    insulin lispro (HUMALOG) 100 UNIT/ML injection Inject Subcutaneous 3 times daily (before meals) Continuous per Tandem Pump    Krill Oil 500 MG CAPS Take 1 capsule by mouth daily    levonorgestrel (MIRENA) 20 MCG/24HR IUD 1 each by Intrauterine route once          Family History   T1DM in her sister, T2DM in her father. Father - valcular heart disease. Mother -  "Parkinson's and osteoporosis. Brother - scleroderma. One sister - osteoporosis. Mother broke her hip in her 70s.     Social History   with 3 children. She denies smoking or using illicit drugs. She drinks 1-2 glasses of wine, twice a week. Occupation: .     Review of Systems   Systemic:               No sign fatigue  Eye:                       No eye symptoms   Edgar-Laryngeal:      No edgar-laryngeal symptoms, no dysphagia, no voice hoarseness, no cough      Breast:                   No breast symptoms  Cardiovascular:     No cardiovascular symptoms, no CP; rapid rhthm palpitations present occasionally and lasting less then 2 minutes -    Pulmonary:            No pulmonary symptoms, no cough or SOB    Gastrointestinal:    No gastrointestinal symptoms, no diarrhea or constipation   Genitourinary:        No genitourinary symptoms    Endocrine:             No endocrine symptoms, no heat or cold intolerance   Neurological:          no headaches, no tremor, no dizziness     Musculoskeletal:    Left hip intermittent pain   Skin:                       Scalp eczema with itchiness   Psychological:       No psychological symptoms                Vital Signs     Previous Weights:    Wt Readings from Last 10 Encounters:   11/13/18 80.1 kg (176 lb 9.6 oz)   07/11/18 76.2 kg (167 lb 15.9 oz)                   /82 (BP Location: Left arm, Patient Position: Chair, Cuff Size: Adult Regular)  Pulse 66  Ht 1.565 m (5' 1.61\")  Wt 80.1 kg (176 lb 9.6 oz)  SpO2 100%  BMI 32.71 kg/m2    Physical Exam  General Appearance:  she is well developed, well nourished and in no distress     Eyes:  conjutivae and extra-ocular motions are normal.                                    pupils round and reactive to light, no lid lag, no stare    HEENT:   oropharynx clear and moist, no JVD, no bruits      no thyromegaly, no palpable nodules   Cardiovascular:  regular rhythm, systolic murmur right 2nd IC space, distal pulse palpable, " no edema  Respiratory:       chest clear, no rales, no rhonchi   Gastrointestinal: abdomen soft, non-tender, non-distended, normal bowel sounds,   no organomegaly   Musculoskeletal: normal tone and strength  Psychiatric: affect and judgment normal  Skin: Benign abd striae   Neurological: reflexes normal and symmetric, no resting tremor      Lab Results  I reviewed prior lab results documented in Epic.  Lab Results   Component Value Date    A1C 7.1 (A) 11/13/2018    A1C 6.9 07/11/2018       No results found for: HGB  No results found for: HCT  No results found for: CHOL, CHOLHDLRATIO, HDL, LDL, VLDL, TRIG  No results found for: MICROL  No results found for: TSH      Last Basic Metabolic Panel:    No results found for: NA  No results found for: POTASSIUM  No results found for: CHLORIDE  No results found for: MIHIR  No results found for: CO2  No results found for: BUN  No results found for: CR  No results found for: GFRESTIMATED  No results found for: GLC    No results found for: AST  No results found for: ALT  No results found for: UMALCR    Assessment     Type 1 diabetes in a 48 year old female, diagnosed around age 29, fairly well controlled, without known microvascular disease.  She frequently boluses manually, mainly for food intake.    Recommendations:  Change sensitivity to 45  Change insulin to carbohydrate ratio to 1 unit per 10 g around the clock  Change active insulin time to 3 hours  Use the pump to administer insulin as correction, based on the blood sugar value  Used bolus settings for all meals and snacks  Administer insulin 15-20 minutes prior to eating.  Follow-up lab work at her next visit    Orders Placed This Encounter   Procedures     Albumin Random Urine Quantitative with Creat Ratio     Comprehensive metabolic panel     Hematocrit     Lipid panel reflex to direct LDL Fasting     TSH with free T4 reflex     Hemoglobin A1c

## 2018-11-13 NOTE — MR AVS SNAPSHOT
After Visit Summary   11/13/2018    Nancy Bedoya    MRN: [id number]           Patient Information     Date Of Birth          1970        Visit Information        Provider Department      11/13/2018 10:45 AM Page Rosado MD; MG ENDO NURSE [facility]        Today's Diagnoses     Type 1 diabetes mellitus not at goal (H)    -  1      Care Instructions    [facility]-Department of Endocrinology  Vale Collado RN, Diabetes Educator: [phone]  Clinic Nurses Claudia Fuentes: [phone]  Clinic Fax: [phone]  On-Call Endocrine at [facility] (after hours/weekends): [phone] option 4  Scheduling Line: [phone]    Appointment Reminders:  * Please bring meter with for staff to download  * If you are due ONLY for an A1C, it is scheduled with the nurse and will be done in clinic. You do not need to schedule a lab appointment. Fasting is not required for an A1C.  * Refill request should be submitted to your pharmacy. They will contact clinic for approval.    Preventive Care:    Diabetic Eye Exam Screening: During our visit today, we discussed that it is recommended you receive diabetic eye exam screening. Please call or make an appointment with your primary care provider to discuss this with them. You may also call the [facility] scheduling line ([phone]) to set up an eye exam at one of the [facility] Eye Clinics.            Preventive Care:    Diabetic Eye Exam Screening: During our visit today, we discussed that it is recommended you receive diabetic eye exam screening. Please call or make an appointment with your primary care provider to discuss this with them. You may also call the [facility] scheduling line ([phone]) to set up an eye exam at one of the [facility] Clinics.              Follow-ups after your visit        Follow-up notes from your care team     Return in about 6 months (around 5/13/2019) for labs in 6 months,  Lab work prior to F/U appt.      Your next 10 appointments already scheduled     May 21, 2019  8:30 AM CDT   LAB with LAB FIRST FLOOR [facility] ([facility] [place])    [address] [place] [id number] [phone]           Please do not eat 10-12 hours before your appointment if you are coming in fasting for labs on lipids, cholesterol, or glucose (sugar). This does not apply to pregnant women. Water, hot tea and black coffee (with nothing added) are okay. Do not drink other fluids, diet soda or chew gum.            May 21, 2019  9:00 AM CDT   Return Visit with Page Rosado MD   [facility] ([facility])    [address] [address] [place] [id number] [phone]              Future tests that were ordered for you today     Open Future Orders        Priority Expected Expires Ordered    Hemoglobin A1c Routine 6/13/2019 11/13/2019 11/13/2018    Lipid panel reflex to direct LDL Fasting Routine 5/12/2019 11/13/2019 11/13/2018    TSH with free T4 reflex Routine 5/12/2019 11/13/2019 11/13/2018    Albumin Random Urine Quantitative with Creat Ratio Routine 5/12/2019 11/13/2019 11/13/2018    Comprehensive metabolic panel Routine 5/12/2019 11/13/2019 11/13/2018    Hematocrit Routine 5/12/2019 11/13/2019 11/13/2018            Who to contact     If you have questions or need follow up information about today's clinic visit or your schedule please contact [facility] directly at [phone].  Normal or non-critical lab and imaging results will be communicated to you by MyChart, letter or phone within 4 business days after the clinic has received the results. If you do not hear from us within 7 days, please contact the clinic through MyChart or phone. If you have a critical or abnormal lab result, we will notify you by phone as soon as possible.  Submit refill requests through [employer] or call your pharmacy and  "they will forward the refill request to us. Please allow 3 business days for your refill to be completed.          Additional Information About Your Visit        Lightwave Powerhart Information     [email or web address] is an electronic gateway that provides easy, online access to your medical records. With [email or web address], you can request a clinic appointment, read your test results, renew a prescription or communicate with your care team.     To sign up for [email or web address] visit the website at [email or web address]   You will be asked to enter the access code listed below, as well as some personal information. Please follow the directions to create your username and password.     Your access code is: [email or web address]  Expires: 2019 11:42 AM     Your access code will  in 90 days. If you need help or a new code, please contact your [facility] or call [phone] for assistance.        Care EveryWhere ID     This is your Care EveryWhere ID. This could be used by other organizations to access your [facility] medical records  [id number]        Your Vitals Were     Pulse Height Pulse Oximetry BMI (Body Mass Index)          66 1.565 m (5' 1.61\") 100% 32.71 kg/m2         Blood Pressure from Last 3 Encounters:   18 125/82   18 127/77    Weight from Last 3 Encounters:   18 80.1 kg (176 lb 9.6 oz)   18 76.2 kg (167 lb 15.9 oz)              We Performed the Following     Hemoglobin A1c POCT        Primary Care Provider Office Phone # Fax #    Andrea CLIF Asencio [phone]       [facility] [address] [address] [facility] [place] [address]        Equal Access to Services     TAMIR BUENROSTRO : Hadii rafael franks hadashdeja Soanali, waaxda luqadaha, qaybta kaalmada adonay mistry. So [facility] [phone].    ATENCIÓN: Si habla español, tiene a mosqueda disposición servicios gratuitos de asistencia lingüística. Llame al [phone].    We comply with applicable federal " civil rights laws and [place] laws. We do not discriminate on the basis of race, color, national origin, age, disability, sex, sexual orientation, or gender identity.            Thank you!     Thank you for choosing [facility]  for your care. Our goal is always to provide you with excellent care. Hearing back from our patients is one way we can continue to improve our services. Please take a few minutes to complete the written survey that you may receive in the mail after your visit with us. Thank you!             Your Updated Medication List - Protect others around you: Learn how to safely use, store and throw away your medicines at www.disposemymeds.org.          This list is accurate as of 11/13/18 11:42 AM.  Always use your most recent med list.                   Brand Name Dispense Instructions for use Diagnosis    atorvastatin 40 MG tablet    LIPITOR     Take 40 mg by mouth daily        glucagon 1 MG kit    GLUCAGON EMERGENCY    1 mg    Inject 1 mg into the muscle once for 1 dose    Type 1 diabetes mellitus without complication (H)       insulin lispro 100 UNIT/ML injection    HumaLOG     Inject Subcutaneous 3 times daily (before meals) Continuous per Tandem Pump        levonorgestrel 20 MCG/24HR IUD    MIRENA     1 each by Intrauterine route once        vitamin D 2000 units Caps      Take 1 tablet by mouth daily

## 2020-03-11 ENCOUNTER — HEALTH MAINTENANCE LETTER (OUTPATIENT)
Age: 50
End: 2020-03-11

## 2020-12-27 ENCOUNTER — HEALTH MAINTENANCE LETTER (OUTPATIENT)
Age: 50
End: 2020-12-27

## 2021-04-25 ENCOUNTER — HEALTH MAINTENANCE LETTER (OUTPATIENT)
Age: 51
End: 2021-04-25

## 2021-08-14 ENCOUNTER — HEALTH MAINTENANCE LETTER (OUTPATIENT)
Age: 51
End: 2021-08-14

## 2021-10-09 ENCOUNTER — HEALTH MAINTENANCE LETTER (OUTPATIENT)
Age: 51
End: 2021-10-09

## 2022-03-26 ENCOUNTER — HEALTH MAINTENANCE LETTER (OUTPATIENT)
Age: 52
End: 2022-03-26

## 2022-05-21 ENCOUNTER — HEALTH MAINTENANCE LETTER (OUTPATIENT)
Age: 52
End: 2022-05-21

## 2022-09-17 ENCOUNTER — HEALTH MAINTENANCE LETTER (OUTPATIENT)
Age: 52
End: 2022-09-17

## 2023-01-23 ENCOUNTER — HEALTH MAINTENANCE LETTER (OUTPATIENT)
Age: 53
End: 2023-01-23

## 2023-06-04 ENCOUNTER — HEALTH MAINTENANCE LETTER (OUTPATIENT)
Age: 53
End: 2023-06-04

## 2023-07-29 ENCOUNTER — HEALTH MAINTENANCE LETTER (OUTPATIENT)
Age: 53
End: 2023-07-29

## 2023-09-05 ENCOUNTER — LAB REQUISITION (OUTPATIENT)
Dept: LAB | Facility: CLINIC | Age: 53
End: 2023-09-05

## 2023-09-05 DIAGNOSIS — N95.1 MENOPAUSAL AND FEMALE CLIMACTERIC STATES: ICD-10-CM

## 2023-09-05 PROCEDURE — 82670 ASSAY OF TOTAL ESTRADIOL: CPT | Performed by: OBSTETRICS & GYNECOLOGY

## 2023-09-05 PROCEDURE — 83001 ASSAY OF GONADOTROPIN (FSH): CPT | Performed by: OBSTETRICS & GYNECOLOGY

## 2023-09-06 LAB
ESTRADIOL SERPL-MCNC: 9 PG/ML
FSH SERPL IRP2-ACNC: 63.3 MIU/ML

## 2024-02-24 ENCOUNTER — HEALTH MAINTENANCE LETTER (OUTPATIENT)
Age: 54
End: 2024-02-24